# Patient Record
Sex: MALE | URBAN - METROPOLITAN AREA
[De-identification: names, ages, dates, MRNs, and addresses within clinical notes are randomized per-mention and may not be internally consistent; named-entity substitution may affect disease eponyms.]

---

## 2018-08-14 ENCOUNTER — APPOINTMENT (RX ONLY)
Dept: URBAN - METROPOLITAN AREA CLINIC 86 | Facility: CLINIC | Age: 58
Setting detail: DERMATOLOGY
End: 2018-08-14

## 2018-08-14 DIAGNOSIS — L30.8 OTHER SPECIFIED DERMATITIS: ICD-10-CM

## 2018-08-14 PROBLEM — L85.3 XEROSIS CUTIS: Status: ACTIVE | Noted: 2018-08-14

## 2018-08-14 PROBLEM — K21.9 GASTRO-ESOPHAGEAL REFLUX DISEASE WITHOUT ESOPHAGITIS: Status: ACTIVE | Noted: 2018-08-14

## 2018-08-14 PROCEDURE — ? COUNSELING

## 2018-08-14 PROCEDURE — ? TREATMENT REGIMEN

## 2018-08-14 PROCEDURE — 96372 THER/PROPH/DIAG INJ SC/IM: CPT

## 2018-08-14 PROCEDURE — ? INTRAMUSCULAR KENALOG

## 2018-08-14 ASSESSMENT — LOCATION ZONE DERM
LOCATION ZONE: TRUNK
LOCATION ZONE: HAND

## 2018-08-14 ASSESSMENT — LOCATION SIMPLE DESCRIPTION DERM
LOCATION SIMPLE: LEFT HAND
LOCATION SIMPLE: RIGHT HAND
LOCATION SIMPLE: RIGHT LOWER BACK

## 2018-08-14 ASSESSMENT — LOCATION DETAILED DESCRIPTION DERM
LOCATION DETAILED: LEFT THENAR EMINENCE
LOCATION DETAILED: RIGHT INFERIOR LATERAL LOWER BACK
LOCATION DETAILED: RIGHT ULNAR PALM

## 2018-08-14 NOTE — PROCEDURE: TREATMENT REGIMEN
Initiate Treatment: Tetrix cream qd [start in two (2) days] - if RX works - Pt  will call our office to fill RX
Samples Given: Tetrix and Epiceram
Plan: Referred Pt to have batch testing with Dr. Whatley
Detail Level: Zone

## 2018-08-14 NOTE — PROCEDURE: INTRAMUSCULAR KENALOG
Concentration (Mg/Ml): 40.0
Total Volume (Ccs): 1.5
Kenalog Preparation: kenalog
Consent: The risks of atrophy were reviewed with the patient.
Detail Level: None
Administered By (Optional): AMRIT Kitchen

## 2018-08-14 NOTE — HPI: OTHER
Condition:: Pt us here for bleeding cracking hands x 2 years.
Please Describe Your Condition:: Pt has tried steroid creams and lotions & OTC with no resolution.

## 2018-10-25 ENCOUNTER — APPOINTMENT (RX ONLY)
Dept: URBAN - METROPOLITAN AREA CLINIC 86 | Facility: CLINIC | Age: 58
Setting detail: DERMATOLOGY
End: 2018-10-25

## 2018-10-25 DIAGNOSIS — L30.8 OTHER SPECIFIED DERMATITIS: ICD-10-CM | Status: IMPROVED

## 2018-10-25 PROCEDURE — ? TREATMENT REGIMEN

## 2018-10-25 PROCEDURE — 99212 OFFICE O/P EST SF 10 MIN: CPT

## 2018-10-25 PROCEDURE — ? COUNSELING

## 2018-10-25 ASSESSMENT — PAIN INTENSITY VAS: HOW INTENSE IS YOUR PAIN 0 BEING NO PAIN, 10 BEING THE MOST SEVERE PAIN POSSIBLE?: NO PAIN

## 2018-10-25 ASSESSMENT — LOCATION SIMPLE DESCRIPTION DERM
LOCATION SIMPLE: RIGHT HAND
LOCATION SIMPLE: LEFT HAND

## 2018-10-25 ASSESSMENT — LOCATION ZONE DERM: LOCATION ZONE: HAND

## 2018-10-25 ASSESSMENT — LOCATION DETAILED DESCRIPTION DERM
LOCATION DETAILED: RIGHT ULNAR PALM
LOCATION DETAILED: LEFT THENAR EMINENCE

## 2018-10-25 ASSESSMENT — BSA RASH: BSA RASH: 2

## 2018-10-25 ASSESSMENT — SEVERITY ASSESSMENT: SEVERITY: ALMOST CLEAR

## 2018-11-26 ENCOUNTER — APPOINTMENT (RX ONLY)
Dept: URBAN - METROPOLITAN AREA CLINIC 86 | Facility: CLINIC | Age: 58
Setting detail: DERMATOLOGY
End: 2018-11-26

## 2018-11-26 DIAGNOSIS — L259 CONTACT DERMATITIS AND OTHER ECZEMA, UNSPECIFIED CAUSE: ICD-10-CM

## 2018-11-26 PROBLEM — L23.9 ALLERGIC CONTACT DERMATITIS, UNSPECIFIED CAUSE: Status: ACTIVE | Noted: 2018-11-26

## 2018-11-26 PROCEDURE — ? PATCH TESTING

## 2018-11-26 PROCEDURE — 95044 PATCH/APPLICATION TESTS: CPT

## 2018-11-26 PROCEDURE — ? COUNSELING

## 2018-11-26 ASSESSMENT — LOCATION SIMPLE DESCRIPTION DERM: LOCATION SIMPLE: LEFT UPPER BACK

## 2018-11-26 ASSESSMENT — LOCATION DETAILED DESCRIPTION DERM: LOCATION DETAILED: LEFT MID-UPPER BACK

## 2018-11-26 ASSESSMENT — LOCATION ZONE DERM: LOCATION ZONE: TRUNK

## 2018-11-26 NOTE — PROCEDURE: PATCH TESTING
Number Of Patches (Maximum Allowable Per Dos By Cms Is 90): 80
Detail Level: None
Consent: Written consent obtained, risks reviewed including but not limited to rash, itching, allergic reaction, systemic rash, remote possiblity of anaphylaxis to allergen.
Post-Care Instructions: I reviewed with the patient in detail post-care instructions. Patient should not sweat, pick at, or get the patches wet for 48 hours.

## 2018-11-28 ENCOUNTER — APPOINTMENT (RX ONLY)
Dept: URBAN - METROPOLITAN AREA CLINIC 86 | Facility: CLINIC | Age: 58
Setting detail: DERMATOLOGY
End: 2018-11-28

## 2018-11-28 DIAGNOSIS — L259 CONTACT DERMATITIS AND OTHER ECZEMA, UNSPECIFIED CAUSE: ICD-10-CM

## 2018-11-28 PROBLEM — L23.9 ALLERGIC CONTACT DERMATITIS, UNSPECIFIED CAUSE: Status: ACTIVE | Noted: 2018-11-28

## 2018-11-28 PROCEDURE — ? CORE ACDS PATCH TEST READING

## 2018-11-28 PROCEDURE — 99212 OFFICE O/P EST SF 10 MIN: CPT

## 2018-11-28 NOTE — PROCEDURE: CORE ACDS PATCH TEST READING
Name Of Allergen 54: Chloroxylenol (PCMX)
Name Of Allergen 31: Hydrocortisone-17-butyrate
Allergen 42 Reaction: no reaction
Name Of Allergen 3: Neomycin
Number Of Patches Read: 80
Name Of Allergen 37: Propylene glycol
Name Of Allergen 45: Decyl glucoside
Name Of Allergen 58: Cocamide CHRISTA (coconut CHRISTA)
Name Of Allergen 49: Tocopherol
Name Of Allergen 14: Epoxy Resin (Bisphenol A)
Name Of Allergen 73: Amidoamine
Name Of Allergen 63: Sorbic acid
Name Of Allergen 80: Benzyl alcohol
Name Of Allergen 40: Cocamidopropyl betaine
Name Of Allergen 71: Triamcinolone
Name Of Allergen 2: Lanolin (Amerchol 101)
Name Of Allergen 17: Methylchlorisothiazolinone/Methylisothiazolinone
Name Of Allergen 55: Benzophenone-3 (2-Hydroxy-4-methoxybenzophenone)
Name Of Allergen 7: Colophony
Name Of Allergen 15: Carba Mix
Name Of Allergen 48: Cinnamal - Cinnamic aldehyde
Name Of Allergen 26: Benzocaine
Name Of Allergen 57: Sesquiterpene lactone Mix
Name Of Allergen 72: Clobetasol-17 propionate
Name Of Allergen 56: Tosylamide formaldehyde resin
Name Of Allergen 79: 2-Ethylhexyl-4-methoxycinnamate (Parsol MCX)
Name Of Allergen 50: Ethyl acrylate
Name Of Allergen 70: Ethylhexylglycerin
Name Of Allergen 44: Oleamidopropyl dimethylamine
Name Of Allergen 36: Lidocaine
Name Of Allergen 1: Nickel
Name Of Allergen 59: 4-Chloro-3-cresol (PCMC)
What Reading Time Point?: 48 hour
Name Of Allergen 11: Ethylenediamine dihydrochloride
Name Of Allergen 77: Benzoic acid
Name Of Allergen 10: Balsam of [place] (Myroxylon pereirae)
Name Of Allergen 22: Mercapto Mix
Allergen 15 Reaction: +/-
Name Of Allergen 23: Bronopol 2-Bromo-2-nitropropane-1,3-diol
Name Of Allergen 34: Fragrance Mix II
Name Of Allergen 12: Cobalt chloride
Name Of Allergen 18: Quaternium 15 (Dowicil 200)
Name Of Allergen 61: Benzophenone-4 (2-Hydroxy-4-methoxybenzophenone-5)
Name Of Allergen 32: Mercaptobenzothiazole
Name Of Allergen 60: Benzalkonium chloride
Name Of Allergen 42: 3-(Dimethylamino)-propylamine
Name Of Allergen 69: Cetyl steryl alcohol
Name Of Allergen 51: Tea tree oil
Name Of Allergen 16: Black Rubber Mix
Name Of Allergen 4: Potassium dichromate
Name Of Allergen 39: Polymyxin B
Show Negative Results In The Note?: Yes
Name Of Allergen 62: Sodium benzoate
Name Of Allergen 64: Ylang-Ylang oil (Cananga odorata)
Name Of Allergen 19: MDBGN (Methyldibromoglutaronitrile)
Name Of Allergen 43: HEMA Hydroxyethyl methacrylate
Name Of Allergen 76: Disperse Orange 3
Name Of Allergen 13: p-onyy-Arlvkjwqdyq formaldehyde resin
Name Of Allergen 24: Thiuram Mix
Name Of Allergen 46: Methyl methacrylate
Name Of Allergen 66: Ethyleneurea melanine-formaldehyde
Name Of Allergen 75: Phenoxyethanol
Name Of Allergen 21: Formaldehyde
Show Allergen Counseling In The Note?: No
Name Of Allergen 47: Lavender absolute
Name Of Allergen 28: Gold sodium thiosulfate
Name Of Allergen 33: Bacitracin
Detail Level: Zone
Name Of Allergen 25: Diazolidinyl urea (Germall II)
Name Of Allergen 5: DMDM Hydantoin
Name Of Allergen 6: Fragrance Mix I
Name Of Allergen 74: Ethyl cyanoacrylate
Name Of Allergen 68: n,n-Diphenylguanidine
Name Of Allergen 53: Propolis
Name Of Allergen 8: Paraben Mix
Name Of Allergen 27: [id number]
Name Of Allergen 29: Imidazolidinyl urea (Germal 115)
Name Of Allergen 9: Methylisothiazolinone
Name Of Allergen 65: Compositae Mix
Name Of Allergen 67: Sorbitan sesquioleate
Name Of Allergen 30: Budesonide
Name Of Allergen 41: Mixed dialkyl thioureas
Name Of Allergen 35: Disperse Blue 106/124 Mix
Name Of Allergen 78: BHT (2-Ditert-butyl-4-cresol)
Name Of Allergen 52: Chlorhexidine digluconate
Name Of Allergen 38: Iodopropynyl butylcarbamate
Name Of Allergen 20: p-Phenylenediamine

## 2018-11-29 ENCOUNTER — APPOINTMENT (RX ONLY)
Dept: URBAN - METROPOLITAN AREA CLINIC 86 | Facility: CLINIC | Age: 58
Setting detail: DERMATOLOGY
End: 2018-11-29

## 2018-11-29 DIAGNOSIS — L259 CONTACT DERMATITIS AND OTHER ECZEMA, UNSPECIFIED CAUSE: ICD-10-CM

## 2018-11-29 PROBLEM — L23.9 ALLERGIC CONTACT DERMATITIS, UNSPECIFIED CAUSE: Status: ACTIVE | Noted: 2018-11-29

## 2018-11-29 PROCEDURE — ? ADDITIONAL NOTES

## 2018-11-29 PROCEDURE — 99214 OFFICE O/P EST MOD 30 MIN: CPT

## 2018-11-29 PROCEDURE — ? COUNSELING

## 2018-11-29 PROCEDURE — ? CORE ACDS PATCH TEST READING

## 2018-11-29 PROCEDURE — ? PRESCRIPTION

## 2018-11-29 RX ORDER — CALCIPOTRIENE AND BETAMETHASONE DIPROPIONATE 50; .5 UG/G; MG/G
AEROSOL, FOAM TOPICAL
Qty: 1 | Refills: 2 | Status: ERX | COMMUNITY
Start: 2018-11-29

## 2018-11-29 RX ORDER — TAZAROTENE 1 MG/G
CREAM CUTANEOUS
Qty: 1 | Refills: 2 | Status: ERX | COMMUNITY
Start: 2018-11-29

## 2018-11-29 RX ADMIN — CALCIPOTRIENE AND BETAMETHASONE DIPROPIONATE: 50; .5 AEROSOL, FOAM TOPICAL at 18:22

## 2018-11-29 RX ADMIN — TAZAROTENE: 1 CREAM CUTANEOUS at 18:23

## 2018-11-29 NOTE — PROCEDURE: ADDITIONAL NOTES
Detail Level: Simple
Additional Notes: TIERNEY’s database list generated \\nPt education and avoidance discussed

## 2018-11-29 NOTE — HPI: TESTING (PATCH TESTING READING, DISCUSSION OF RESULTS)
How Severe Is Your Rash?: mild
What Patch Testing Have You Undergone?: Core ACDS Recommended Series
What Reading Is This?: 72 hour patch test reading

## 2018-11-29 NOTE — PROCEDURE: CORE ACDS PATCH TEST READING
Name Of Allergen 30: Budesonide
Allergen 37 Reaction: +/-
Allergen 17 Reaction: no reaction
Name Of Allergen 78: BHT (2-Ditert-butyl-4-cresol)
Name Of Allergen 37: Propylene glycol
Name Of Allergen 75: Phenoxyethanol
Name Of Allergen 1: Nickel
Name Of Allergen 28: Gold sodium thiosulfate
Name Of Allergen 70: Ethylhexylglycerin
Name Of Allergen 7: Colophony
Name Of Allergen 46: Methyl methacrylate
Name Of Allergen 40: Cocamidopropyl betaine
Name Of Allergen 48: Cinnamal - Cinnamic aldehyde
Name Of Allergen 71: Triamcinolone
Name Of Allergen 8: Paraben Mix
Name Of Allergen 22: Mercapto Mix
Name Of Allergen 69: Cetyl steryl alcohol
Name Of Allergen 80: Benzyl alcohol
Name Of Allergen 18: Quaternium 15 (Dowicil 200)
Detail Level: Zone
Name Of Allergen 67: Sorbitan sesquioleate
Name Of Allergen 26: Benzocaine
Name Of Allergen 41: Mixed dialkyl thioureas
Name Of Allergen 23: Bronopol 2-Bromo-2-nitropropane-1,3-diol
Name Of Allergen 4: Potassium dichromate
Name Of Allergen 6: Fragrance Mix I
Name Of Allergen 74: Ethyl cyanoacrylate
Name Of Allergen 13: x-quew-Pdmqofbjcwo formaldehyde resin
Name Of Allergen 58: Cocamide CHRISTA (coconut CHRISTA)
Name Of Allergen 36: Lidocaine
Name Of Allergen 21: Formaldehyde
Name Of Allergen 10: Balsam of [place] (Myroxylon pereirae)
Name Of Allergen 34: Fragrance Mix II
Name Of Allergen 52: Chlorhexidine digluconate
Name Of Allergen 38: Iodopropynyl butylcarbamate
Name Of Allergen 47: Lavender absolute
Show Negative Results In The Note?: Yes
Name Of Allergen 57: Sesquiterpene lactone Mix
Name Of Allergen 32: Mercaptobenzothiazole
Show Allergen Counseling In The Note?: No
Name Of Allergen 14: Epoxy Resin (Bisphenol A)
Name Of Allergen 25: Diazolidinyl urea (Germall II)
Name Of Allergen 19: MDBGN (Methyldibromoglutaronitrile)
Name Of Allergen 45: Decyl glucoside
Name Of Allergen 42: 3-(Dimethylamino)-propylamine
Name Of Allergen 53: Propolis
Name Of Allergen 2: Lanolin (Amerchol 101)
Name Of Allergen 51: Tea tree oil
Name Of Allergen 79: 2-Ethylhexyl-4-methoxycinnamate (Parsol MCX)
Name Of Allergen 50: Ethyl acrylate
Name Of Allergen 63: Sorbic acid
Name Of Allergen 66: Ethyleneurea melanine-formaldehyde
Name Of Allergen 17: Methylchlorisothiazolinone/Methylisothiazolinone
Name Of Allergen 9: Methylisothiazolinone
Name Of Allergen 73: Amidoamine
Name Of Allergen 77: Benzoic acid
Name Of Allergen 64: Ylang-Ylang oil (Cananga odorata)
Name Of Allergen 11: Ethylenediamine dihydrochloride
Name Of Allergen 15: Carba Mix
Name Of Allergen 27: [id number]
Name Of Allergen 33: Bacitracin
Name Of Allergen 24: Thiuram Mix
Name Of Allergen 35: Disperse Blue 106/124 Mix
Name Of Allergen 56: Tosylamide formaldehyde resin
Name Of Allergen 61: Benzophenone-4 (2-Hydroxy-4-methoxybenzophenone-5)
Name Of Allergen 68: n,n-Diphenylguanidine
Name Of Allergen 3: Neomycin
Name Of Allergen 39: Polymyxin B
Name Of Allergen 60: Benzalkonium chloride
What Reading Time Point?: 72 hour
Name Of Allergen 12: Cobalt chloride
Name Of Allergen 29: Imidazolidinyl urea (Germal 115)
Name Of Allergen 20: p-Phenylenediamine
Name Of Allergen 55: Benzophenone-3 (2-Hydroxy-4-methoxybenzophenone)
Name Of Allergen 54: Chloroxylenol (PCMX)
Name Of Allergen 59: 4-Chloro-3-cresol (PCMC)
Name Of Allergen 72: Clobetasol-17 propionate
Name Of Allergen 76: Disperse Orange 3
Number Of Patches Read: 80
Name Of Allergen 43: HEMA Hydroxyethyl methacrylate
Name Of Allergen 62: Sodium benzoate
Name Of Allergen 5: DMDM Hydantoin
Name Of Allergen 65: Compositae Mix
Name Of Allergen 49: Tocopherol
Name Of Allergen 31: Hydrocortisone-17-butyrate
Name Of Allergen 16: Black Rubber Mix
Name Of Allergen 44: Oleamidopropyl dimethylamine